# Patient Record
Sex: FEMALE | Race: WHITE | NOT HISPANIC OR LATINO
[De-identification: names, ages, dates, MRNs, and addresses within clinical notes are randomized per-mention and may not be internally consistent; named-entity substitution may affect disease eponyms.]

---

## 2017-02-24 ENCOUNTER — RX RENEWAL (OUTPATIENT)
Age: 57
End: 2017-02-24

## 2017-07-21 ENCOUNTER — NON-APPOINTMENT (OUTPATIENT)
Age: 57
End: 2017-07-21

## 2017-07-21 ENCOUNTER — APPOINTMENT (OUTPATIENT)
Dept: FAMILY MEDICINE | Facility: CLINIC | Age: 57
End: 2017-07-21

## 2017-07-21 VITALS
TEMPERATURE: 98.2 F | WEIGHT: 186 LBS | RESPIRATION RATE: 14 BRPM | OXYGEN SATURATION: 98 % | HEIGHT: 62 IN | DIASTOLIC BLOOD PRESSURE: 80 MMHG | SYSTOLIC BLOOD PRESSURE: 130 MMHG | HEART RATE: 55 BPM | BODY MASS INDEX: 34.23 KG/M2

## 2017-10-23 LAB — HBA1C MFR BLD HPLC: 5.8

## 2017-11-14 ENCOUNTER — APPOINTMENT (OUTPATIENT)
Dept: FAMILY MEDICINE | Facility: CLINIC | Age: 57
End: 2017-11-14

## 2017-11-21 ENCOUNTER — RX RENEWAL (OUTPATIENT)
Age: 57
End: 2017-11-21

## 2018-04-19 LAB — HBA1C MFR BLD HPLC: 5.7

## 2018-08-20 ENCOUNTER — RX RENEWAL (OUTPATIENT)
Age: 58
End: 2018-08-20

## 2018-09-28 LAB — HBA1C MFR BLD HPLC: 5.6

## 2018-10-01 ENCOUNTER — NON-APPOINTMENT (OUTPATIENT)
Age: 58
End: 2018-10-01

## 2018-10-01 ENCOUNTER — APPOINTMENT (OUTPATIENT)
Dept: FAMILY MEDICINE | Facility: CLINIC | Age: 58
End: 2018-10-01
Payer: COMMERCIAL

## 2018-10-01 VITALS
BODY MASS INDEX: 34.01 KG/M2 | RESPIRATION RATE: 14 BRPM | HEART RATE: 58 BPM | SYSTOLIC BLOOD PRESSURE: 116 MMHG | TEMPERATURE: 98.1 F | HEIGHT: 62 IN | DIASTOLIC BLOOD PRESSURE: 72 MMHG | OXYGEN SATURATION: 99 % | WEIGHT: 184.8 LBS

## 2018-10-01 PROCEDURE — 93000 ELECTROCARDIOGRAM COMPLETE: CPT

## 2018-10-01 PROCEDURE — 99396 PREV VISIT EST AGE 40-64: CPT | Mod: 25

## 2018-10-01 PROCEDURE — 90686 IIV4 VACC NO PRSV 0.5 ML IM: CPT

## 2018-10-01 PROCEDURE — G0008: CPT

## 2018-10-01 NOTE — HISTORY OF PRESENT ILLNESS
[de-identified] : Patient presents to the office for routine physical exam. Offers no complaints today. Patient states that she is having a hard time losing weight. Admits to not exercising a lot. Does have a strong family history of heart disease and high cholesterol.

## 2018-10-01 NOTE — ASSESSMENT
[FreeTextEntry1] : Patient presents to the office for routine physical exam. Offers no complaints today. Patient states that she is having a hard time losing weight. Admits to not exercising a lot. Does have a strong family history of heart disease and high cholesterol.\par \par Labs reviewd  Lipids mildly elevated\par EKG NSR\par Referred for ECHO\par Flu shot given\par

## 2018-10-01 NOTE — PHYSICAL EXAM

## 2018-10-01 NOTE — HEALTH RISK ASSESSMENT
[Patient reported mammogram was normal] : Patient reported mammogram was normal [Patient reported PAP Smear was normal] : Patient reported PAP Smear was normal [MammogramDate] : 4/18 [PapSmearDate] : 7/18 [ColonoscopyDate] : 5/18 [ColonoscopyComments] : small polyp removed

## 2018-10-25 ENCOUNTER — APPOINTMENT (OUTPATIENT)
Dept: CV DIAGNOSITCS | Facility: HOSPITAL | Age: 58
End: 2018-10-25

## 2018-10-25 ENCOUNTER — OUTPATIENT (OUTPATIENT)
Dept: OUTPATIENT SERVICES | Facility: HOSPITAL | Age: 58
LOS: 1 days | End: 2018-10-25
Payer: COMMERCIAL

## 2018-10-25 DIAGNOSIS — R01.1 CARDIAC MURMUR, UNSPECIFIED: ICD-10-CM

## 2018-10-25 PROCEDURE — 93306 TTE W/DOPPLER COMPLETE: CPT | Mod: 26

## 2018-10-25 PROCEDURE — 93306 TTE W/DOPPLER COMPLETE: CPT

## 2018-11-16 ENCOUNTER — APPOINTMENT (OUTPATIENT)
Dept: FAMILY MEDICINE | Facility: CLINIC | Age: 58
End: 2018-11-16
Payer: COMMERCIAL

## 2018-11-16 VITALS — DIASTOLIC BLOOD PRESSURE: 60 MMHG | TEMPERATURE: 98.3 F | SYSTOLIC BLOOD PRESSURE: 120 MMHG

## 2018-11-16 DIAGNOSIS — M25.471 EFFUSION, RIGHT ANKLE: ICD-10-CM

## 2018-11-16 PROCEDURE — 99213 OFFICE O/P EST LOW 20 MIN: CPT

## 2018-11-16 NOTE — REVIEW OF SYSTEMS
[Joint Pain] : joint pain [Joint Swelling] : joint swelling [Negative] : Constitutional [Joint Stiffness] : no joint stiffness [Muscle Weakness] : no muscle weakness [Muscle Pain] : no muscle pain [Back Pain] : no back pain

## 2018-11-16 NOTE — HISTORY OF PRESENT ILLNESS
[FreeTextEntry8] : 49-year-old female with a history of hypertension presents for right ankle pain for over a year. Patient reports that she had right ankle injury 12 years ago, no surgery but she was put in a cast. Patient states that since that time she was okay until one year ago. Patient reports that every morning when she wakes up she feels pain on the outer portion of the ankle. Feels a dull ache 2/10 severity worse with the first step and then better throughout the day. Radiates up the ankle slightly.

## 2018-11-16 NOTE — PHYSICAL EXAM
[No Acute Distress] : no acute distress [Well Nourished] : well nourished [Well Developed] : well developed [Normal Voice/Communication] : normal voice/communication [No Joint Swelling] : no joint swelling [Grossly Normal Strength/Tone] : grossly normal strength/tone

## 2019-02-05 ENCOUNTER — APPOINTMENT (OUTPATIENT)
Dept: FAMILY MEDICINE | Facility: CLINIC | Age: 59
End: 2019-02-05

## 2019-05-14 ENCOUNTER — RESULT REVIEW (OUTPATIENT)
Age: 59
End: 2019-05-14

## 2019-05-19 ENCOUNTER — RX RENEWAL (OUTPATIENT)
Age: 59
End: 2019-05-19

## 2019-08-22 ENCOUNTER — APPOINTMENT (OUTPATIENT)
Dept: FAMILY MEDICINE | Facility: CLINIC | Age: 59
End: 2019-08-22
Payer: COMMERCIAL

## 2019-08-22 VITALS
BODY MASS INDEX: 34.78 KG/M2 | SYSTOLIC BLOOD PRESSURE: 118 MMHG | HEIGHT: 62 IN | HEART RATE: 64 BPM | RESPIRATION RATE: 14 BRPM | DIASTOLIC BLOOD PRESSURE: 80 MMHG | OXYGEN SATURATION: 98 % | WEIGHT: 189 LBS | TEMPERATURE: 98.4 F

## 2019-08-22 PROCEDURE — 99214 OFFICE O/P EST MOD 30 MIN: CPT

## 2019-08-22 NOTE — PHYSICAL EXAM
[Normal] : normal gait, coordination grossly intact, no focal deficits [de-identified] : grade 2/6 systolic murmur  [de-identified] : Warm, dry, intact.  No rashes.  [de-identified] : AA&Ox3

## 2019-08-22 NOTE — PLAN
[FreeTextEntry1] : Pre-op\par - Pt is optimized for surgery, no additional testing needed.\par - d/c all vitamins until surgery, Restart 72hrs s/p surgery.\par - Pt provided pre-op clearance form, to be scanned and sent to surgeon.\par

## 2019-08-22 NOTE — RESULTS/DATA
[] : results reviewed [de-identified] : normal [de-identified] : normal [de-identified] : normal

## 2019-08-22 NOTE — HISTORY OF PRESENT ILLNESS
[No Pertinent Cardiac History] : no history of aortic stenosis, atrial fibrillation, coronary artery disease, recent myocardial infarction, or implantable device/pacemaker [No Adverse Anesthesia Reaction] : no adverse anesthesia reaction in self or family member [No Pertinent Pulmonary History] : no history of asthma, COPD, sleep apnea, or smoking [(Patient denies any chest pain, claudication, dyspnea on exertion, orthopnea, palpitations or syncope)] : Patient denies any chest pain, claudication, dyspnea on exertion, orthopnea, palpitations or syncope [Chronic Anticoagulation] : no chronic anticoagulation [Chronic Kidney Disease] : no chronic kidney disease [Diabetes] : no diabetes [FreeTextEntry1] : cone of cervix [FreeTextEntry2] : 08/27/2019  [FreeTextEntry4] : 57y/o F with PMHx of HTN, HLD and Prediabetes and PSHx of tubal ligation presents to the office for Cone biopsy pre op. Pt has h/o HPV, dx over 7 years ago. Pt had abnormal pap smears for past 2 years. Pt was started on Folic acid and had several biopsies. BP in office is 118/80. Denies HA, CP, SOB, N/V/D, fever or chills. NKDA.

## 2019-08-22 NOTE — ADDENDUM
[FreeTextEntry1] : I, Caterina Wood, acted solely as a scribe for Dr. Solorzano on this date 08/22/2019.\par \par All medical record entries made by the Scribe were at my, Dr. Solorzano, direction and personally dictated by me on 08/22/2019. I have reviewed the chart and agree that the record accurately reflects my personal performance of the history, physical exam, assessment and plan.  I have also personally directed, reviewed and agreed with the chart.

## 2019-08-22 NOTE — REVIEW OF SYSTEMS
[Negative] : Neurological [Fever] : no fever [Chills] : no chills [Chest Pain] : no chest pain [Shortness Of Breath] : no shortness of breath [Nausea] : no nausea [Diarrhea] : diarrhea [Vomiting] : no vomiting [Headache] : no headache

## 2019-12-05 ENCOUNTER — APPOINTMENT (OUTPATIENT)
Dept: FAMILY MEDICINE | Facility: CLINIC | Age: 59
End: 2019-12-05
Payer: COMMERCIAL

## 2019-12-05 VITALS
BODY MASS INDEX: 34.6 KG/M2 | DIASTOLIC BLOOD PRESSURE: 74 MMHG | RESPIRATION RATE: 14 BRPM | HEIGHT: 62 IN | OXYGEN SATURATION: 99 % | TEMPERATURE: 97.7 F | SYSTOLIC BLOOD PRESSURE: 130 MMHG | HEART RATE: 59 BPM | WEIGHT: 188 LBS

## 2019-12-05 PROCEDURE — G0008: CPT

## 2019-12-05 PROCEDURE — 90686 IIV4 VACC NO PRSV 0.5 ML IM: CPT

## 2019-12-05 PROCEDURE — 93000 ELECTROCARDIOGRAM COMPLETE: CPT

## 2019-12-05 PROCEDURE — 99396 PREV VISIT EST AGE 40-64: CPT | Mod: 25

## 2019-12-05 RX ORDER — NAPROXEN 500 MG/1
500 TABLET ORAL
Qty: 28 | Refills: 0 | Status: COMPLETED | COMMUNITY
Start: 2018-11-16 | End: 2019-12-05

## 2019-12-05 NOTE — HISTORY OF PRESENT ILLNESS
[de-identified] : 58y/o F with PMHx of HTN (Losartan/HCTZ), Prediabetes, and HLD  presents to the office for routine annual physical exam. 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 Pt had echocardiogram in 2018, normal. Pt admits not exercising. Offers no new complaints at this time. BP in office is 130/74, on repeat 130/80. Pt monitors BP occasionally with SBP ranging from 115-120. Last colonoscopy in 2018. Last Mammo in 2018. Last PAP in 2019, normal. Pt had cone biopsy 4 months ago. NKDA. Denies HA, CP, SOB, N/V/D, fever, or chills. Denies changes in bowel or bladder function. Requests flu vaccine in office.

## 2019-12-05 NOTE — PLAN
[FreeTextEntry1] : Health maintenance\par - Routine annual blood work drawn in office today\par - Encouraged exercise, weight loss, and healthy diet\par - Discussed Shingrix\par - Flu vaccine given in office today \par \par HTN\par - F/u in 6 months for BP check and weight check

## 2019-12-05 NOTE — PHYSICAL EXAM
[Normal] : normal gait, coordination grossly intact, no focal deficits [de-identified] : no cervical lymphadenopathy  [de-identified] : 1/6 systolic murmur [de-identified] : AA&Ox3  [de-identified] : Warm, dry, intact. No rashes.

## 2019-12-05 NOTE — REVIEW OF SYSTEMS
[Vision Problems] : vision problems [Negative] : Neurological [Fever] : no fever [Chest Pain] : no chest pain [Chills] : no chills [Shortness Of Breath] : no shortness of breath [Nausea] : no nausea [Vomiting] : no vomiting [Diarrhea] : diarrhea [Headache] : no headache

## 2019-12-05 NOTE — ADDENDUM
[FreeTextEntry1] : I, Margoth Pinzon, acted solely as a scribe for Dr. Solorzano on this date 12/05/2019.\par \par All medical record entries made by the Scribe were at my, Dr. Solorzano, direction and personally dictated by me on 12/05/2019. I have reviewed the chart and agree that the record accurately reflects my personal performance of the history, physical exam, assessment and plan.  I have also personally directed, reviewed and agreed with the chart.

## 2019-12-12 LAB
25(OH)D3 SERPL-MCNC: 51.6 NG/ML
ALBUMIN SERPL ELPH-MCNC: 4.1 G/DL
ALP BLD-CCNC: 103 U/L
ALT SERPL-CCNC: 19 U/L
ANION GAP SERPL CALC-SCNC: 13 MMOL/L
APPEARANCE: CLEAR
AST SERPL-CCNC: 15 U/L
BACTERIA: NEGATIVE
BASOPHILS # BLD AUTO: 0.04 K/UL
BASOPHILS NFR BLD AUTO: 0.7 %
BILIRUB SERPL-MCNC: 0.3 MG/DL
BILIRUBIN URINE: NEGATIVE
BLOOD URINE: NEGATIVE
BUN SERPL-MCNC: 19 MG/DL
CALCIUM SERPL-MCNC: 9.8 MG/DL
CHLORIDE SERPL-SCNC: 104 MMOL/L
CHOLEST SERPL-MCNC: 194 MG/DL
CHOLEST/HDLC SERPL: 2.8 RATIO
CO2 SERPL-SCNC: 24 MMOL/L
COLOR: NORMAL
CREAT SERPL-MCNC: 0.65 MG/DL
EOSINOPHIL # BLD AUTO: 0.28 K/UL
EOSINOPHIL NFR BLD AUTO: 4.6 %
ESTIMATED AVERAGE GLUCOSE: 114 MG/DL
FOLATE SERPL-MCNC: 13.7 NG/ML
GLUCOSE QUALITATIVE U: NEGATIVE
GLUCOSE SERPL-MCNC: 98 MG/DL
HBA1C MFR BLD HPLC: 5.6 %
HCT VFR BLD CALC: 40.6 %
HDLC SERPL-MCNC: 70 MG/DL
HGB BLD-MCNC: 12.5 G/DL
HYALINE CASTS: 1 /LPF
IMM GRANULOCYTES NFR BLD AUTO: 0.2 %
KETONES URINE: NEGATIVE
LDLC SERPL CALC-MCNC: 112 MG/DL
LEUKOCYTE ESTERASE URINE: NEGATIVE
LYMPHOCYTES # BLD AUTO: 1.76 K/UL
LYMPHOCYTES NFR BLD AUTO: 29.2 %
MAN DIFF?: NORMAL
MCHC RBC-ENTMCNC: 28.3 PG
MCHC RBC-ENTMCNC: 30.8 GM/DL
MCV RBC AUTO: 92.1 FL
MICROSCOPIC-UA: NORMAL
MONOCYTES # BLD AUTO: 0.45 K/UL
MONOCYTES NFR BLD AUTO: 7.5 %
NEUTROPHILS # BLD AUTO: 3.49 K/UL
NEUTROPHILS NFR BLD AUTO: 57.8 %
NITRITE URINE: NEGATIVE
PH URINE: 6.5
PLATELET # BLD AUTO: 226 K/UL
POTASSIUM SERPL-SCNC: 4.2 MMOL/L
PROT SERPL-MCNC: 6.8 G/DL
PROTEIN URINE: NEGATIVE
RBC # BLD: 4.41 M/UL
RBC # FLD: 15.9 %
RED BLOOD CELLS URINE: 1 /HPF
SODIUM SERPL-SCNC: 141 MMOL/L
SPECIFIC GRAVITY URINE: 1.01
SQUAMOUS EPITHELIAL CELLS: 1 /HPF
TRIGL SERPL-MCNC: 60 MG/DL
TSH SERPL-ACNC: 2.74 UIU/ML
UROBILINOGEN URINE: NORMAL
VIT B12 SERPL-MCNC: 867 PG/ML
WBC # FLD AUTO: 6.03 K/UL
WHITE BLOOD CELLS URINE: 1 /HPF

## 2020-02-13 ENCOUNTER — RX RENEWAL (OUTPATIENT)
Age: 60
End: 2020-02-13

## 2021-01-14 ENCOUNTER — APPOINTMENT (OUTPATIENT)
Dept: FAMILY MEDICINE | Facility: CLINIC | Age: 61
End: 2021-01-14
Payer: COMMERCIAL

## 2021-01-14 ENCOUNTER — NON-APPOINTMENT (OUTPATIENT)
Age: 61
End: 2021-01-14

## 2021-01-14 VITALS
BODY MASS INDEX: 33.86 KG/M2 | HEART RATE: 61 BPM | TEMPERATURE: 98.9 F | OXYGEN SATURATION: 99 % | HEIGHT: 62 IN | DIASTOLIC BLOOD PRESSURE: 72 MMHG | SYSTOLIC BLOOD PRESSURE: 124 MMHG | RESPIRATION RATE: 14 BRPM | WEIGHT: 184 LBS

## 2021-01-14 DIAGNOSIS — K21.9 GASTRO-ESOPHAGEAL REFLUX DISEASE W/OUT ESOPHAGITIS: ICD-10-CM

## 2021-01-14 DIAGNOSIS — Z11.59 ENCOUNTER FOR SCREENING FOR OTHER VIRAL DISEASES: ICD-10-CM

## 2021-01-14 DIAGNOSIS — D72.10 EOSINOPHILIA, UNSPECIFIED: ICD-10-CM

## 2021-01-14 DIAGNOSIS — Z82.49 FAMILY HISTORY OF ISCHEMIC HEART DISEASE AND OTHER DISEASES OF THE CIRCULATORY SYSTEM: ICD-10-CM

## 2021-01-14 DIAGNOSIS — Z80.42 FAMILY HISTORY OF MALIGNANT NEOPLASM OF PROSTATE: ICD-10-CM

## 2021-01-14 DIAGNOSIS — M54.32 SCIATICA, LEFT SIDE: ICD-10-CM

## 2021-01-14 DIAGNOSIS — Z82.5 FAMILY HISTORY OF ASTHMA AND OTHER CHRONIC LOWER RESPIRATORY DISEASES: ICD-10-CM

## 2021-01-14 DIAGNOSIS — H53.8 OTHER VISUAL DISTURBANCES: ICD-10-CM

## 2021-01-14 DIAGNOSIS — Z82.3 FAMILY HISTORY OF STROKE: ICD-10-CM

## 2021-01-14 PROCEDURE — G0447 BEHAVIOR COUNSEL OBESITY 15M: CPT

## 2021-01-14 PROCEDURE — 99072 ADDL SUPL MATRL&STAF TM PHE: CPT

## 2021-01-14 PROCEDURE — G0442 ANNUAL ALCOHOL SCREEN 15 MIN: CPT

## 2021-01-14 PROCEDURE — 93000 ELECTROCARDIOGRAM COMPLETE: CPT | Mod: 59

## 2021-01-14 PROCEDURE — G0444 DEPRESSION SCREEN ANNUAL: CPT

## 2021-01-14 PROCEDURE — 99396 PREV VISIT EST AGE 40-64: CPT | Mod: 25

## 2021-01-15 ENCOUNTER — TRANSCRIPTION ENCOUNTER (OUTPATIENT)
Age: 61
End: 2021-01-15

## 2021-01-15 PROBLEM — M54.32 SCIATICA OF LEFT SIDE: Status: ACTIVE | Noted: 2021-01-15

## 2021-01-15 PROBLEM — D72.10 MARKED EOSINOPHILIA: Status: ACTIVE | Noted: 2021-01-14

## 2021-01-15 PROBLEM — K21.9 GERD (GASTROESOPHAGEAL REFLUX DISEASE): Status: ACTIVE | Noted: 2021-01-15

## 2021-01-15 NOTE — PHYSICAL EXAM
[Declined Breast Exam] : declined breast exam  [Coordination Grossly Intact] : coordination grossly intact [No Focal Deficits] : no focal deficits [Normal Gait] : normal gait [Speech Grossly Normal] : speech grossly normal [Alert and Oriented x3] : oriented to person, place, and time [Normal Mood] : the mood was normal [Normal] : affect was normal and insight and judgment were intact

## 2021-01-15 NOTE — COUNSELING
[Potential consequences of obesity discussed] : Potential consequences of obesity discussed [Benefits of weight loss discussed] : Benefits of weight loss discussed [Encouraged to increase physical activity] : Encouraged to increase physical activity [Encouraged to use exercise tracking device] : Encouraged to use exercise tracking device [Weigh Self Weekly] : weigh self weekly [Decrease Portions] : decrease portions [____ min/wk Activity] : [unfilled] min/wk activity [Good understanding] : Patient has a good understanding of disease, goals and obesity follow-up plan [FreeTextEntry4] : 15

## 2021-01-15 NOTE — HEALTH RISK ASSESSMENT
[Fair] :  ~his/her~ mood as fair [Yes] : Yes [2 - 4 times a month (2 pts)] : 2-4 times a month (2 points) [1 or 2 (0 pts)] : 1 or 2 (0 points) [Never (0 pts)] : Never (0 points) [No] : In the past 12 months have you used drugs other than those required for medical reasons? No [No falls in past year] : Patient reported no falls in the past year [0] : 2) Feeling down, depressed, or hopeless: Not at all (0) [Patient reported mammogram was normal] : Patient reported mammogram was normal [Patient reported PAP Smear was abnormal] : Patient reported PAP Smear was abnormal [Patient reported colonoscopy was normal] : Patient reported colonoscopy was normal [With Family] : lives with family [# of Members in Household ___] :  household currently consist of [unfilled] member(s) [Employed] : employed [] :  [# Of Children ___] : has [unfilled] children [Sexually Active] : sexually active [Feels Safe at Home] : Feels safe at home [Fully functional (bathing, dressing, toileting, transferring, walking, feeding)] : Fully functional (bathing, dressing, toileting, transferring, walking, feeding) [Fully functional (using the telephone, shopping, preparing meals, housekeeping, doing laundry, using] : Fully functional and needs no help or supervision to perform IADLs (using the telephone, shopping, preparing meals, housekeeping, doing laundry, using transportation, managing medications and managing finances) [Reports changes in vision] : Reports changes in vision [Reports normal functional visual acuity (ie: able to read med bottle)] : Reports normal functional visual acuity [FreeTextEntry1] : see hpi [] : No [Audit-CScore] : 2 [de-identified] : not great [de-identified] : plans to increase [[id number]] : 0 [Reports changes in hearing] : Reports no changes in hearing [MammogramDate] : 2020 [PapSmearComments] : HPV [PapSmearDate] : 2020 [BoneDensityComments] : scheduled already [ColonoscopyDate] : 2018 [ColonoscopyComments] : polyp, f/u in 5 years [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

## 2021-01-15 NOTE — REVIEW OF SYSTEMS
[Heartburn] : heartburn [Muscle Pain] : muscle pain [Back Pain] : back pain [Negative] : Heme/Lymph [Vision Problems] : vision problems [Discharge] : no discharge [Pain] : no pain [Redness] : no redness [Dryness] : no dryness  [Itching] : no itching [Abdominal Pain] : no abdominal pain [Nausea] : no nausea [Constipation] : no constipation [Diarrhea] : diarrhea [Vomiting] : no vomiting [Melena] : no melena [FreeTextEntry3] : right eye [FreeTextEntry9] : lower back pain, radiating into legs

## 2021-01-15 NOTE — HISTORY OF PRESENT ILLNESS
[FreeTextEntry1] : 59 yo female with PMHx HTN (losartan/hctz) presents to the office for a physical. [de-identified] : The patient reports experiencing episodes of lower back pain, when she is sitting or standing for too long, which radiates into her lower extremities, left greater than right leg. she denies any weakness, but reports some "shooting pain and tingling" down her thighs.

## 2021-04-19 ENCOUNTER — NON-APPOINTMENT (OUTPATIENT)
Age: 61
End: 2021-04-19

## 2021-04-28 ENCOUNTER — RESULT REVIEW (OUTPATIENT)
Age: 61
End: 2021-04-28

## 2021-04-28 ENCOUNTER — APPOINTMENT (OUTPATIENT)
Dept: MAMMOGRAPHY | Facility: CLINIC | Age: 61
End: 2021-04-28
Payer: COMMERCIAL

## 2021-04-28 ENCOUNTER — APPOINTMENT (OUTPATIENT)
Dept: ULTRASOUND IMAGING | Facility: CLINIC | Age: 61
End: 2021-04-28
Payer: COMMERCIAL

## 2021-04-28 PROCEDURE — 76641 ULTRASOUND BREAST COMPLETE: CPT | Mod: 50

## 2021-04-28 PROCEDURE — 77067 SCR MAMMO BI INCL CAD: CPT

## 2021-04-28 PROCEDURE — 77063 BREAST TOMOSYNTHESIS BI: CPT

## 2022-01-18 ENCOUNTER — APPOINTMENT (OUTPATIENT)
Dept: FAMILY MEDICINE | Facility: CLINIC | Age: 62
End: 2022-01-18
Payer: COMMERCIAL

## 2022-01-18 ENCOUNTER — NON-APPOINTMENT (OUTPATIENT)
Age: 62
End: 2022-01-18

## 2022-01-18 VITALS
TEMPERATURE: 97.1 F | OXYGEN SATURATION: 98 % | HEART RATE: 64 BPM | DIASTOLIC BLOOD PRESSURE: 72 MMHG | SYSTOLIC BLOOD PRESSURE: 110 MMHG | WEIGHT: 184.2 LBS | HEIGHT: 62 IN | RESPIRATION RATE: 14 BRPM | BODY MASS INDEX: 33.9 KG/M2

## 2022-01-18 DIAGNOSIS — Z23 ENCOUNTER FOR IMMUNIZATION: ICD-10-CM

## 2022-01-18 DIAGNOSIS — R01.1 CARDIAC MURMUR, UNSPECIFIED: ICD-10-CM

## 2022-01-18 PROCEDURE — 90686 IIV4 VACC NO PRSV 0.5 ML IM: CPT

## 2022-01-18 PROCEDURE — G0008: CPT

## 2022-01-18 PROCEDURE — G0447 BEHAVIOR COUNSEL OBESITY 15M: CPT

## 2022-01-18 PROCEDURE — 99396 PREV VISIT EST AGE 40-64: CPT | Mod: 25

## 2022-01-18 NOTE — HISTORY OF PRESENT ILLNESS
[de-identified] : 61/o F with PMHx of HTN (Losartan/HCTZ), Prediabetes, and HLD  presents to the office for routine annual physical exam. UTD with immunizations.Labs reviwed WNL except HBA1C 5.8 (5.8) LDL elevated. FH of heart disease. Not compliant with diet or exercise

## 2022-01-18 NOTE — PHYSICAL EXAM
[No Acute Distress] : no acute distress [Well Nourished] : well nourished [Well Developed] : well developed [Well-Appearing] : well-appearing [Normal Sclera/Conjunctiva] : normal sclera/conjunctiva [PERRL] : pupils equal round and reactive to light [EOMI] : extraocular movements intact [Normal Outer Ear/Nose] : the outer ears and nose were normal in appearance [Normal Oropharynx] : the oropharynx was normal [No JVD] : no jugular venous distention [No Lymphadenopathy] : no lymphadenopathy [Supple] : supple [Thyroid Normal, No Nodules] : the thyroid was normal and there were no nodules present [No Respiratory Distress] : no respiratory distress  [No Accessory Muscle Use] : no accessory muscle use [Clear to Auscultation] : lungs were clear to auscultation bilaterally [Normal Rate] : normal rate  [Regular Rhythm] : with a regular rhythm [Normal S1, S2] : normal S1 and S2 [No Carotid Bruits] : no carotid bruits [No Abdominal Bruit] : a ~M bruit was not heard ~T in the abdomen [No Varicosities] : no varicosities [Pedal Pulses Present] : the pedal pulses are present [No Edema] : there was no peripheral edema [No Palpable Aorta] : no palpable aorta [No Extremity Clubbing/Cyanosis] : no extremity clubbing/cyanosis [Soft] : abdomen soft [Non Tender] : non-tender [Non-distended] : non-distended [No Masses] : no abdominal mass palpated [No HSM] : no HSM [Normal Bowel Sounds] : normal bowel sounds [Normal Posterior Cervical Nodes] : no posterior cervical lymphadenopathy [Normal Anterior Cervical Nodes] : no anterior cervical lymphadenopathy [No CVA Tenderness] : no CVA  tenderness [No Spinal Tenderness] : no spinal tenderness [No Joint Swelling] : no joint swelling [Grossly Normal Strength/Tone] : grossly normal strength/tone [No Rash] : no rash [Coordination Grossly Intact] : coordination grossly intact [No Focal Deficits] : no focal deficits [Normal Gait] : normal gait [Deep Tendon Reflexes (DTR)] : deep tendon reflexes were 2+ and symmetric [Normal Affect] : the affect was normal [Normal Insight/Judgement] : insight and judgment were intact [de-identified] : Gr 1-2 /6 systolic M

## 2022-03-14 ENCOUNTER — RX RENEWAL (OUTPATIENT)
Age: 62
End: 2022-03-14

## 2022-04-11 PROBLEM — Z11.59 SCREENING FOR VIRAL DISEASE: Status: ACTIVE | Noted: 2021-01-14

## 2022-06-13 ENCOUNTER — APPOINTMENT (OUTPATIENT)
Dept: FAMILY MEDICINE | Facility: CLINIC | Age: 62
End: 2022-06-13
Payer: COMMERCIAL

## 2022-06-13 VITALS
OXYGEN SATURATION: 97 % | SYSTOLIC BLOOD PRESSURE: 120 MMHG | DIASTOLIC BLOOD PRESSURE: 68 MMHG | HEART RATE: 77 BPM | RESPIRATION RATE: 14 BRPM | TEMPERATURE: 98 F

## 2022-06-13 DIAGNOSIS — R21 RASH AND OTHER NONSPECIFIC SKIN ERUPTION: ICD-10-CM

## 2022-06-13 PROCEDURE — 99213 OFFICE O/P EST LOW 20 MIN: CPT

## 2022-06-16 ENCOUNTER — APPOINTMENT (OUTPATIENT)
Dept: MAMMOGRAPHY | Facility: CLINIC | Age: 62
End: 2022-06-16

## 2022-06-16 ENCOUNTER — APPOINTMENT (OUTPATIENT)
Dept: ULTRASOUND IMAGING | Facility: CLINIC | Age: 62
End: 2022-06-16

## 2022-06-20 PROBLEM — R21 RASH: Status: ACTIVE | Noted: 2022-06-13

## 2022-06-20 NOTE — HISTORY OF PRESENT ILLNESS
[FreeTextEntry8] : c/o rash \par rash recurrent on an off x 1 year\par about a year ago had on belly- was warm and irritated and itchy \par lasted about 3 days\par does not recall any trigger, injury or contact \par occurs every 3 months and then goes away \par 5 days ago- recurrent wheal on sole of foot which went away on its own 2 days ago \par the one on abdomen 2 days ago \par denies any other associated symptoms\par denies any contacts or complaints at this time

## 2022-08-19 DIAGNOSIS — Z12.39 ENCOUNTER FOR OTHER SCREENING FOR MALIGNANT NEOPLASM OF BREAST: ICD-10-CM

## 2022-08-23 ENCOUNTER — RESULT REVIEW (OUTPATIENT)
Age: 62
End: 2022-08-23

## 2022-08-23 ENCOUNTER — OUTPATIENT (OUTPATIENT)
Dept: OUTPATIENT SERVICES | Facility: HOSPITAL | Age: 62
LOS: 1 days | End: 2022-08-23
Payer: MEDICAID

## 2022-08-23 ENCOUNTER — APPOINTMENT (OUTPATIENT)
Dept: MAMMOGRAPHY | Facility: IMAGING CENTER | Age: 62
End: 2022-08-23

## 2022-08-23 ENCOUNTER — APPOINTMENT (OUTPATIENT)
Dept: ULTRASOUND IMAGING | Facility: IMAGING CENTER | Age: 62
End: 2022-08-23

## 2022-08-23 DIAGNOSIS — Z12.39 ENCOUNTER FOR OTHER SCREENING FOR MALIGNANT NEOPLASM OF BREAST: ICD-10-CM

## 2022-08-23 DIAGNOSIS — R92.2 INCONCLUSIVE MAMMOGRAM: ICD-10-CM

## 2022-08-23 PROCEDURE — 76641 ULTRASOUND BREAST COMPLETE: CPT

## 2022-08-23 PROCEDURE — 77063 BREAST TOMOSYNTHESIS BI: CPT

## 2022-08-23 PROCEDURE — 77067 SCR MAMMO BI INCL CAD: CPT

## 2022-08-23 PROCEDURE — 77063 BREAST TOMOSYNTHESIS BI: CPT | Mod: 26

## 2022-08-23 PROCEDURE — 77067 SCR MAMMO BI INCL CAD: CPT | Mod: 26

## 2022-08-23 PROCEDURE — 76641 ULTRASOUND BREAST COMPLETE: CPT | Mod: 26,50

## 2022-10-06 ENCOUNTER — APPOINTMENT (OUTPATIENT)
Dept: FAMILY MEDICINE | Facility: CLINIC | Age: 62
End: 2022-10-06

## 2022-10-06 ENCOUNTER — NON-APPOINTMENT (OUTPATIENT)
Age: 62
End: 2022-10-06

## 2022-10-06 VITALS
BODY MASS INDEX: 34.23 KG/M2 | DIASTOLIC BLOOD PRESSURE: 72 MMHG | HEART RATE: 53 BPM | SYSTOLIC BLOOD PRESSURE: 116 MMHG | WEIGHT: 186 LBS | OXYGEN SATURATION: 97 % | TEMPERATURE: 97.6 F | HEIGHT: 62 IN | RESPIRATION RATE: 14 BRPM

## 2022-10-06 DIAGNOSIS — Z01.818 ENCOUNTER FOR OTHER PREPROCEDURAL EXAMINATION: ICD-10-CM

## 2022-10-06 DIAGNOSIS — Z83.3 FAMILY HISTORY OF DIABETES MELLITUS: ICD-10-CM

## 2022-10-06 DIAGNOSIS — Z82.0 FAMILY HISTORY OF EPILEPSY AND OTHER DISEASES OF THE NERVOUS SYSTEM: ICD-10-CM

## 2022-10-06 DIAGNOSIS — B02.30 ZOSTER OCULAR DISEASE, UNSPECIFIED: ICD-10-CM

## 2022-10-06 DIAGNOSIS — R10.2 PELVIC AND PERINEAL PAIN: ICD-10-CM

## 2022-10-06 PROCEDURE — 99214 OFFICE O/P EST MOD 30 MIN: CPT | Mod: 25

## 2022-10-06 PROCEDURE — 93000 ELECTROCARDIOGRAM COMPLETE: CPT

## 2022-10-06 RX ORDER — MOMETASONE FUROATE 1 MG/G
0.1 OINTMENT TOPICAL TWICE DAILY
Qty: 1 | Refills: 0 | Status: COMPLETED | COMMUNITY
Start: 2022-06-13 | End: 2022-10-06

## 2022-10-06 RX ORDER — VALACYCLOVIR 500 MG/1
500 TABLET, FILM COATED ORAL
Qty: 1 | Refills: 0 | Status: ACTIVE | COMMUNITY
Start: 2022-10-06

## 2022-10-13 DIAGNOSIS — N39.0 URINARY TRACT INFECTION, SITE NOT SPECIFIED: ICD-10-CM

## 2022-10-13 DIAGNOSIS — B95.1 URINARY TRACT INFECTION, SITE NOT SPECIFIED: ICD-10-CM

## 2022-10-13 PROBLEM — Z01.818 PREOPERATIVE CLEARANCE: Status: ACTIVE | Noted: 2019-08-15

## 2022-10-13 PROBLEM — B02.30 OCULAR HERPES: Status: RESOLVED | Noted: 2022-10-06 | Resolved: 2022-10-13

## 2022-10-13 PROBLEM — R10.2 PELVIC PAIN IN FEMALE: Status: ACTIVE | Noted: 2022-10-06

## 2022-10-13 LAB — BACTERIA UR CULT: ABNORMAL

## 2022-10-13 NOTE — HISTORY OF PRESENT ILLNESS
[No Pertinent Cardiac History] : no history of aortic stenosis, atrial fibrillation, coronary artery disease, recent myocardial infarction, or implantable device/pacemaker [No Pertinent Pulmonary History] : no history of asthma, COPD, sleep apnea, or smoking [No Adverse Anesthesia Reaction] : no adverse anesthesia reaction in self or family member [(Patient denies any chest pain, claudication, dyspnea on exertion, orthopnea, palpitations or syncope)] : Patient denies any chest pain, claudication, dyspnea on exertion, orthopnea, palpitations or syncope [____ METs%] : [unfilled] METs% [Anti-Platelet Agents: _____] : Anti-Platelet Agents: [unfilled] [NSAIDs: _____] : NSAIDs: [unfilled] [Herbal Supplements: _____] : Herbal Supplements: [unfilled] [Excellent (>10 METs)] : Excellent (>10 METs) [Aortic Stenosis] : no aortic stenosis [Atrial Fibrillation] : no atrial fibrillation [Coronary Artery Disease] : no coronary artery disease [Recent Myocardial Infarction] : no recent myocardial infarction [Implantable Device/Pacemaker] : no implantable device/pacemaker [Asthma] : no asthma [COPD] : no COPD [Sleep Apnea] : no sleep apnea [Smoker] : not a smoker [Family Member] : no family member with adverse anesthesia reaction/sudden death [Self] : no previous adverse anesthesia reaction [Chronic Anticoagulation] : no chronic anticoagulation [Chronic Kidney Disease] : no chronic kidney disease [Diabetes] : no diabetes [FreeTextEntry1] : hysterectomy  [FreeTextEntry2] : 11/1/2022 [FreeTextEntry3] : Dr. Dominique Salazar  [FreeTextEntry4] : Will be undergoing hysterectomy due to ovarian cyst and endometrial poylp \par overall is feeling well\par denies any other complaints or concerns at this time  [FreeTextEntry6] : none

## 2022-12-07 ENCOUNTER — OFFICE (OUTPATIENT)
Dept: URBAN - METROPOLITAN AREA CLINIC 27 | Facility: CLINIC | Age: 62
Setting detail: OPHTHALMOLOGY
End: 2022-12-07
Payer: COMMERCIAL

## 2022-12-07 DIAGNOSIS — H18.231: ICD-10-CM

## 2022-12-07 DIAGNOSIS — B00.52: ICD-10-CM

## 2022-12-07 PROCEDURE — 92012 INTRM OPH EXAM EST PATIENT: CPT | Performed by: OPHTHALMOLOGY

## 2022-12-07 ASSESSMENT — SPHEQUIV_DERIVED
OD_SPHEQUIV: -1
OD_SPHEQUIV: -1.375
OS_SPHEQUIV: -3.875
OS_SPHEQUIV: -3.25

## 2022-12-07 ASSESSMENT — REFRACTION_MANIFEST
OD_CYLINDER: +1.75
OD_SPHERE: -1.00
OS_VA1: 20/20
OD_ADD: +2.50
OD_VA1: 20/30
OD_AXIS: 090
OS_SPHERE: -3.75
OS_AXIS: 135
OD_SPHERE: -2.25
OD_CYLINDER: SPH
OS_CYLINDER: +1.00
OD_VA1: 20/40
OD_CYLINDER: SPH
OS_ADD: +2.50
OD_VA1: 20/50-
OD_SPHERE: -1.00

## 2022-12-07 ASSESSMENT — REFRACTION_CURRENTRX
OS_OVR_VA: 20/
OD_AXIS: 58
OD_OVR_VA: 20/
OD_VPRISM_DIRECTION: PROGS
OD_SPHERE: -0.75
OD_ADD: +2.25
OS_AXIS: 34
OS_OVR_VA: 20/
OD_CYLINDER: -0.25
OS_AXIS: 114
OS_SPHERE: -3.25
OS_ADD: +2.25
OS_CYLINDER: +0.75
OS_CYLINDER: -0.75
OD_VPRISM_DIRECTION: PROGS
OS_ADD: +2.50
OD_ADD: +2.50
OS_CYLINDER: -0.50
OD_OVR_VA: 20/
OS_AXIS: 026
OD_CYLINDER: SPH
OD_SPHERE: -0.50
OS_OVR_VA: 20/
OD_OVR_VA: 20/
OS_VPRISM_DIRECTION: PROGS
OD_CYLINDER: SPH
OD_VPRISM_DIRECTION: PROGS
OD_SPHERE: -0.75
OS_VPRISM_DIRECTION: PROGS
OS_SPHERE: -3.75
OS_VPRISM_DIRECTION: PROGS
OS_SPHERE: -4.00
OS_ADD: +2.25
OD_ADD: +2.25

## 2022-12-07 ASSESSMENT — CONFRONTATIONAL VISUAL FIELD TEST (CVF)
OD_FINDINGS: FULL
OS_FINDINGS: FULL

## 2022-12-07 ASSESSMENT — SUPERFICIAL PUNCTATE KERATITIS (SPK): OD_SPK: T

## 2022-12-07 ASSESSMENT — PACHYMETRY
OS_CT_CORRECTION: -3
OS_CT_UM: 582

## 2022-12-07 ASSESSMENT — AXIALLENGTH_DERIVED
OD_AL: 23.5767
OS_AL: 24.4425
OS_AL: 24.1839
OD_AL: 23.4316

## 2022-12-07 ASSESSMENT — REFRACTION_AUTOREFRACTION
OS_SPHERE: -5.00
OS_CYLINDER: +2.25
OD_AXIS: 107
OS_AXIS: 127
OD_SPHERE: -2.50
OD_CYLINDER: +3.00

## 2022-12-07 ASSESSMENT — KERATOMETRY
OS_K2POWER_DIOPTERS: 45.75
OD_K1POWER_DIOPTERS: 44.00
OS_AXISANGLE_DEGREES: 115
OS_K1POWER_DIOPTERS: 45.00
OD_AXISANGLE_DEGREES: 096
METHOD_AUTO_MANUAL: AUTO
OD_K2POWER_DIOPTERS: 46.00

## 2022-12-07 ASSESSMENT — TONOMETRY
OD_IOP_MMHG: 12
OS_IOP_MMHG: 15
OS_IOP_MMHG: 12
OD_IOP_MMHG: 12

## 2022-12-07 ASSESSMENT — VISUAL ACUITY
OS_BCVA: 20/50-2
OD_BCVA: 20/25-1+1

## 2023-03-16 LAB
ALBUMIN SERPL ELPH-MCNC: 4.2 G/DL
ALP BLD-CCNC: 108 U/L
ALT SERPL-CCNC: 23 U/L
ANION GAP SERPL CALC-SCNC: 11 MMOL/L
APPEARANCE: CLEAR
APTT BLD: 29.3 SEC
AST SERPL-CCNC: 19 U/L
BACTERIA: NEGATIVE
BASOPHILS # BLD AUTO: 0.04 K/UL
BASOPHILS NFR BLD AUTO: 0.6 %
BILIRUB DIRECT SERPL-MCNC: 0.1 MG/DL
BILIRUB INDIRECT SERPL-MCNC: 0.2 MG/DL
BILIRUB SERPL-MCNC: 0.3 MG/DL
BILIRUBIN URINE: NEGATIVE
BLOOD URINE: NEGATIVE
BUN SERPL-MCNC: 25 MG/DL
CALCIUM SERPL-MCNC: 10.4 MG/DL
CHLORIDE SERPL-SCNC: 102 MMOL/L
CO2 SERPL-SCNC: 28 MMOL/L
COLOR: NORMAL
CREAT SERPL-MCNC: 0.78 MG/DL
EGFR: 86 ML/MIN/1.73M2
EOSINOPHIL # BLD AUTO: 0.34 K/UL
EOSINOPHIL NFR BLD AUTO: 4.8 %
GLUCOSE QUALITATIVE U: NEGATIVE
GLUCOSE SERPL-MCNC: 89 MG/DL
HCT VFR BLD CALC: 42.3 %
HGB BLD-MCNC: 12.9 G/DL
HYALINE CASTS: 0 /LPF
IMM GRANULOCYTES NFR BLD AUTO: 0.1 %
INR PPP: 0.98 RATIO
KETONES URINE: NEGATIVE
LEUKOCYTE ESTERASE URINE: ABNORMAL
LYMPHOCYTES # BLD AUTO: 1.97 K/UL
LYMPHOCYTES NFR BLD AUTO: 28.1 %
MAN DIFF?: NORMAL
MCHC RBC-ENTMCNC: 28 PG
MCHC RBC-ENTMCNC: 30.5 GM/DL
MCV RBC AUTO: 92 FL
MICROSCOPIC-UA: NORMAL
MONOCYTES # BLD AUTO: 0.53 K/UL
MONOCYTES NFR BLD AUTO: 7.5 %
NEUTROPHILS # BLD AUTO: 4.13 K/UL
NEUTROPHILS NFR BLD AUTO: 58.9 %
NITRITE URINE: NEGATIVE
PH URINE: 7.5
PLATELET # BLD AUTO: 231 K/UL
POTASSIUM SERPL-SCNC: 4.1 MMOL/L
PROT SERPL-MCNC: 7.3 G/DL
PROTEIN URINE: NEGATIVE
PT BLD: 11.4 SEC
RBC # BLD: 4.6 M/UL
RBC # FLD: 14.1 %
RED BLOOD CELLS URINE: 2 /HPF
SODIUM SERPL-SCNC: 141 MMOL/L
SPECIFIC GRAVITY URINE: 1.01
SQUAMOUS EPITHELIAL CELLS: 2 /HPF
UROBILINOGEN URINE: NORMAL
WBC # FLD AUTO: 7.02 K/UL
WHITE BLOOD CELLS URINE: 2 /HPF

## 2023-03-22 ENCOUNTER — APPOINTMENT (OUTPATIENT)
Dept: FAMILY MEDICINE | Facility: CLINIC | Age: 63
End: 2023-03-22
Payer: MEDICAID

## 2023-03-22 VITALS
HEIGHT: 62 IN | DIASTOLIC BLOOD PRESSURE: 82 MMHG | HEART RATE: 54 BPM | WEIGHT: 174.6 LBS | RESPIRATION RATE: 14 BRPM | SYSTOLIC BLOOD PRESSURE: 120 MMHG | OXYGEN SATURATION: 98 % | BODY MASS INDEX: 32.13 KG/M2 | TEMPERATURE: 95.5 F

## 2023-03-22 DIAGNOSIS — E55.9 VITAMIN D DEFICIENCY, UNSPECIFIED: ICD-10-CM

## 2023-03-22 PROCEDURE — 99396 PREV VISIT EST AGE 40-64: CPT | Mod: 25

## 2023-03-22 PROCEDURE — G0447 BEHAVIOR COUNSEL OBESITY 15M: CPT

## 2023-03-22 PROCEDURE — G0444 DEPRESSION SCREEN ANNUAL: CPT | Mod: 59

## 2023-03-22 RX ORDER — CEPHALEXIN 500 MG/1
500 CAPSULE ORAL 3 TIMES DAILY
Qty: 15 | Refills: 0 | Status: COMPLETED | COMMUNITY
Start: 2022-10-13 | End: 2023-03-22

## 2023-03-27 PROBLEM — E55.9 VITAMIN D DEFICIENCY: Status: ACTIVE | Noted: 2023-03-14

## 2023-03-27 NOTE — HEALTH RISK ASSESSMENT
[Very Good] : ~his/her~  mood as very good [Yes] : Yes [2 - 4 times a month (2 pts)] : 2-4 times a month (2 points) [1 or 2 (0 pts)] : 1 or 2 (0 points) [Never (0 pts)] : Never (0 points) [No] : In the past 12 months have you used drugs other than those required for medical reasons? No [0] : 2) Feeling down, depressed, or hopeless: Not at all (0) [PHQ-2 Negative - No further assessment needed] : PHQ-2 Negative - No further assessment needed [Patient reported colonoscopy was abnormal] : Patient reported colonoscopy was abnormal [With Family] : lives with family [# of Members in Household ___] :  household currently consist of [unfilled] member(s) [Employed] : employed [] :  [# Of Children ___] : has [unfilled] children [Sexually Active] : sexually active [Feels Safe at Home] : Feels safe at home [Fully functional (bathing, dressing, toileting, transferring, walking, feeding)] : Fully functional (bathing, dressing, toileting, transferring, walking, feeding) [Fully functional (using the telephone, shopping, preparing meals, housekeeping, doing laundry, using] : Fully functional and needs no help or supervision to perform IADLs (using the telephone, shopping, preparing meals, housekeeping, doing laundry, using transportation, managing medications and managing finances) [Reports normal functional visual acuity (ie: able to read med bottle)] : Reports normal functional visual acuity [No falls in past year] : Patient reported no falls in the past year [Patient reported mammogram was normal] : Patient reported mammogram was normal [Patient reported PAP Smear was normal] : Patient reported PAP Smear was normal [Patient reported bone density results were abnormal] : Patient reported bone density results were abnormal [Audit-CScore] : 2 [de-identified] : active [de-identified] : tries to be healthy [[id number]] : 0 [High Risk Behavior] : no high risk behavior [Reports changes in hearing] : Reports no changes in hearing [Reports changes in vision] : Reports no changes in vision [MammogramDate] : 2022 [PapSmearDate] : 2022 [BoneDensityDate] : 2021 [BoneDensityComments] : referral given [ColonoscopyDate] : 2018 [ColonoscopyComments] : polyp, f/u in five years [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

## 2023-03-27 NOTE — HISTORY OF PRESENT ILLNESS
[FreeTextEntry1] : 63 yo female with PMHx HTN (losartan/hctz) presents to the office for a physical.  [de-identified] : the patient reports feeling well, has been experiencing increase in hot flashes, usually anxiety related. is UTD with shingles vaccine.

## 2023-06-21 ENCOUNTER — RX ONLY (RX ONLY)
Age: 63
End: 2023-06-21

## 2023-06-21 ENCOUNTER — OFFICE (OUTPATIENT)
Dept: URBAN - METROPOLITAN AREA CLINIC 27 | Facility: CLINIC | Age: 63
Setting detail: OPHTHALMOLOGY
End: 2023-06-21
Payer: COMMERCIAL

## 2023-06-21 DIAGNOSIS — H25.13: ICD-10-CM

## 2023-06-21 DIAGNOSIS — H18.231: ICD-10-CM

## 2023-06-21 DIAGNOSIS — B00.52: ICD-10-CM

## 2023-06-21 PROCEDURE — 92012 INTRM OPH EXAM EST PATIENT: CPT | Performed by: OPHTHALMOLOGY

## 2023-06-21 ASSESSMENT — REFRACTION_CURRENTRX
OS_CYLINDER: +0.75
OD_CYLINDER: SPH
OS_SPHERE: -3.25
OS_OVR_VA: 20/
OD_OVR_VA: 20/
OS_SPHERE: -3.75
OD_SPHERE: -0.50
OD_VPRISM_DIRECTION: PROGS
OD_VPRISM_DIRECTION: PROGS
OS_OVR_VA: 20/
OS_ADD: +2.25
OS_OVR_VA: 20/
OD_ADD: +2.50
OS_AXIS: 114
OD_CYLINDER: -0.25
OS_SPHERE: -4.00
OS_ADD: +2.25
OS_VPRISM_DIRECTION: PROGS
OD_OVR_VA: 20/
OD_OVR_VA: 20/
OS_CYLINDER: -0.50
OS_ADD: +2.50
OS_VPRISM_DIRECTION: PROGS
OD_SPHERE: -0.75
OD_CYLINDER: SPH
OS_VPRISM_DIRECTION: PROGS
OD_ADD: +2.25
OD_VPRISM_DIRECTION: PROGS
OD_SPHERE: -0.75
OD_ADD: +2.25
OS_CYLINDER: -0.75
OS_AXIS: 34
OS_AXIS: 026
OD_AXIS: 58

## 2023-06-21 ASSESSMENT — REFRACTION_MANIFEST
OD_VA1: 20/50-
OD_CYLINDER: SPH
OD_VA1: 20/40
OD_SPHERE: -1.00
OS_AXIS: 135
OD_SPHERE: -2.25
OD_AXIS: 090
OS_SPHERE: -3.75
OD_CYLINDER: +1.75
OD_SPHERE: -1.00
OS_ADD: +2.50
OS_VA1: 20/20
OD_ADD: +2.50
OS_CYLINDER: +1.00
OD_VA1: 20/30
OD_CYLINDER: SPH

## 2023-06-21 ASSESSMENT — REFRACTION_AUTOREFRACTION
OS_SPHERE: -4.75
OS_CYLINDER: +2.22
OD_CYLINDER: +2.75
OD_AXIS: 106
OD_SPHERE: -2.00
OS_AXIS: 5128

## 2023-06-21 ASSESSMENT — VISUAL ACUITY
OS_BCVA: 20/40-2
OD_BCVA: 20/25-2

## 2023-06-21 ASSESSMENT — SPHEQUIV_DERIVED
OD_SPHEQUIV: -1.375
OS_SPHEQUIV: -3.25
OS_SPHEQUIV: -3.64
OD_SPHEQUIV: -0.625

## 2023-06-21 ASSESSMENT — KERATOMETRY
OD_K1POWER_DIOPTERS: 44.00
METHOD_AUTO_MANUAL: AUTO
OS_K2POWER_DIOPTERS: 45.75
OD_AXISANGLE_DEGREES: 087
OD_K2POWER_DIOPTERS: 46.00
OS_K1POWER_DIOPTERS: 45.00
OS_AXISANGLE_DEGREES: 111

## 2023-06-21 ASSESSMENT — PACHYMETRY
OS_CT_UM: 582
OS_CT_CORRECTION: -3

## 2023-06-21 ASSESSMENT — AXIALLENGTH_DERIVED
OS_AL: 24.34
OD_AL: 23.2884
OD_AL: 23.5767
OS_AL: 24.1839

## 2023-06-21 ASSESSMENT — TONOMETRY
OS_IOP_MMHG: 15
OD_IOP_MMHG: 15
OS_IOP_MMHG: 14

## 2023-06-21 ASSESSMENT — SUPERFICIAL PUNCTATE KERATITIS (SPK): OD_SPK: T

## 2023-09-18 ENCOUNTER — OFFICE (OUTPATIENT)
Dept: URBAN - METROPOLITAN AREA CLINIC 27 | Facility: CLINIC | Age: 63
Setting detail: OPHTHALMOLOGY
End: 2023-09-18

## 2023-09-18 DIAGNOSIS — H25.13: ICD-10-CM

## 2023-09-18 DIAGNOSIS — H10.45: ICD-10-CM

## 2023-09-18 DIAGNOSIS — B00.52: ICD-10-CM

## 2023-09-18 DIAGNOSIS — H16.221: ICD-10-CM

## 2023-09-18 DIAGNOSIS — H18.231: ICD-10-CM

## 2023-09-18 PROCEDURE — 92012 INTRM OPH EXAM EST PATIENT: CPT | Performed by: OPHTHALMOLOGY

## 2023-09-18 ASSESSMENT — TONOMETRY
OD_IOP_MMHG: 13
OD_IOP_MMHG: 10
OS_IOP_MMHG: 12

## 2023-09-18 ASSESSMENT — REFRACTION_CURRENTRX
OD_OVR_VA: 20/
OD_OVR_VA: 20/
OD_CYLINDER: SPH
OD_CYLINDER: -0.25
OS_VPRISM_DIRECTION: PROGS
OD_VPRISM_DIRECTION: PROGS
OS_AXIS: 026
OS_OVR_VA: 20/
OS_ADD: +2.25
OS_AXIS: 34
OD_ADD: +2.25
OS_CYLINDER: +0.75
OD_ADD: +2.25
OS_CYLINDER: -0.75
OD_AXIS: 58
OD_SPHERE: -0.75
OS_SPHERE: -3.25
OS_VPRISM_DIRECTION: PROGS
OD_SPHERE: -0.50
OD_OVR_VA: 20/
OD_VPRISM_DIRECTION: PROGS
OD_CYLINDER: SPH
OS_OVR_VA: 20/
OS_OVR_VA: 20/
OD_SPHERE: -0.75
OS_ADD: +2.50
OS_VPRISM_DIRECTION: PROGS
OS_SPHERE: -4.00
OD_VPRISM_DIRECTION: PROGS
OS_ADD: +2.25
OS_CYLINDER: -0.50
OS_SPHERE: -3.75
OS_AXIS: 114
OD_ADD: +2.50

## 2023-09-18 ASSESSMENT — KERATOMETRY
OD_K1POWER_DIOPTERS: 44.00
OS_K2POWER_DIOPTERS: 45.75
OD_K2POWER_DIOPTERS: 46.00
OS_K1POWER_DIOPTERS: 45.00
METHOD_AUTO_MANUAL: AUTO
OS_AXISANGLE_DEGREES: 117
OD_AXISANGLE_DEGREES: 106

## 2023-09-18 ASSESSMENT — REFRACTION_AUTOREFRACTION
OS_SPHERE: -4.50
OS_CYLINDER: +2.25
OD_CYLINDER: +3.00
OS_AXIS: 132
OD_AXIS: 110
OD_SPHERE: -2.00

## 2023-09-18 ASSESSMENT — REFRACTION_MANIFEST
OD_VA1: 20/40
OS_VA1: 20/20
OD_CYLINDER: SPH
OD_VA1: 20/50-
OD_SPHERE: -1.00
OD_ADD: +2.50
OD_AXIS: 090
OD_CYLINDER: SPH
OD_SPHERE: -2.25
OS_CYLINDER: +1.00
OS_SPHERE: -3.75
OD_SPHERE: -1.00
OS_ADD: +2.50
OD_VA1: 20/30
OD_CYLINDER: +1.75
OS_AXIS: 135

## 2023-09-18 ASSESSMENT — VISUAL ACUITY
OS_BCVA: 20/30-2
OD_BCVA: 20/20

## 2023-09-18 ASSESSMENT — AXIALLENGTH_DERIVED
OD_AL: 23.5767
OS_AL: 24.1839
OS_AL: 24.2352
OD_AL: 23.241

## 2023-09-18 ASSESSMENT — PACHYMETRY
OS_CT_CORRECTION: -3
OS_CT_UM: 582

## 2023-09-18 ASSESSMENT — SPHEQUIV_DERIVED
OD_SPHEQUIV: -1.375
OS_SPHEQUIV: -3.25
OD_SPHEQUIV: -0.5
OS_SPHEQUIV: -3.375

## 2023-09-18 ASSESSMENT — SUPERFICIAL PUNCTATE KERATITIS (SPK): OD_SPK: T

## 2023-10-31 ENCOUNTER — APPOINTMENT (OUTPATIENT)
Dept: ULTRASOUND IMAGING | Facility: IMAGING CENTER | Age: 63
End: 2023-10-31
Payer: MEDICAID

## 2023-10-31 ENCOUNTER — RESULT REVIEW (OUTPATIENT)
Age: 63
End: 2023-10-31

## 2023-10-31 ENCOUNTER — APPOINTMENT (OUTPATIENT)
Dept: MAMMOGRAPHY | Facility: IMAGING CENTER | Age: 63
End: 2023-10-31
Payer: MEDICAID

## 2023-10-31 ENCOUNTER — OUTPATIENT (OUTPATIENT)
Dept: OUTPATIENT SERVICES | Facility: HOSPITAL | Age: 63
LOS: 1 days | End: 2023-10-31
Payer: MEDICAID

## 2023-10-31 ENCOUNTER — APPOINTMENT (OUTPATIENT)
Dept: RADIOLOGY | Facility: IMAGING CENTER | Age: 63
End: 2023-10-31
Payer: MEDICAID

## 2023-10-31 DIAGNOSIS — Z00.8 ENCOUNTER FOR OTHER GENERAL EXAMINATION: ICD-10-CM

## 2023-10-31 PROCEDURE — 77067 SCR MAMMO BI INCL CAD: CPT | Mod: 26

## 2023-10-31 PROCEDURE — 77080 DXA BONE DENSITY AXIAL: CPT | Mod: 26

## 2023-10-31 PROCEDURE — 77063 BREAST TOMOSYNTHESIS BI: CPT | Mod: 26

## 2023-10-31 PROCEDURE — 76641 ULTRASOUND BREAST COMPLETE: CPT | Mod: 26,50

## 2023-10-31 PROCEDURE — 76641 ULTRASOUND BREAST COMPLETE: CPT

## 2023-10-31 PROCEDURE — 77080 DXA BONE DENSITY AXIAL: CPT

## 2023-10-31 PROCEDURE — 77067 SCR MAMMO BI INCL CAD: CPT

## 2023-10-31 PROCEDURE — 77063 BREAST TOMOSYNTHESIS BI: CPT

## 2024-02-07 ENCOUNTER — OFFICE (OUTPATIENT)
Dept: URBAN - METROPOLITAN AREA CLINIC 27 | Facility: CLINIC | Age: 64
Setting detail: OPHTHALMOLOGY
End: 2024-02-07

## 2024-02-07 DIAGNOSIS — H25.13: ICD-10-CM

## 2024-02-07 DIAGNOSIS — H16.221: ICD-10-CM

## 2024-02-07 DIAGNOSIS — H10.45: ICD-10-CM

## 2024-02-07 DIAGNOSIS — H18.231: ICD-10-CM

## 2024-02-07 DIAGNOSIS — B00.52: ICD-10-CM

## 2024-02-07 PROCEDURE — 92012 INTRM OPH EXAM EST PATIENT: CPT | Performed by: OPHTHALMOLOGY

## 2024-02-07 ASSESSMENT — SPHEQUIV_DERIVED
OS_SPHEQUIV: -3.5
OD_SPHEQUIV: -1.375
OD_SPHEQUIV: -0.875
OS_SPHEQUIV: -3.25

## 2024-02-07 ASSESSMENT — REFRACTION_CURRENTRX
OS_AXIS: 34
OD_SPHERE: -0.50
OD_CYLINDER: -0.25
OS_VPRISM_DIRECTION: PROGS
OS_VPRISM_DIRECTION: PROGS
OS_SPHERE: -3.75
OD_SPHERE: -0.75
OD_VPRISM_DIRECTION: PROGS
OS_CYLINDER: -0.75
OD_OVR_VA: 20/
OS_ADD: +2.25
OS_AXIS: 114
OS_VPRISM_DIRECTION: PROGS
OS_OVR_VA: 20/
OS_CYLINDER: +0.75
OS_ADD: +2.50
OD_CYLINDER: SPH
OS_ADD: +2.25
OS_SPHERE: -3.25
OD_VPRISM_DIRECTION: PROGS
OD_ADD: +2.25
OD_ADD: +2.50
OS_OVR_VA: 20/
OD_AXIS: 58
OS_SPHERE: -4.00
OS_AXIS: 026
OS_CYLINDER: -0.50
OD_OVR_VA: 20/
OD_ADD: +2.25
OD_OVR_VA: 20/
OD_CYLINDER: SPH
OS_OVR_VA: 20/
OD_VPRISM_DIRECTION: PROGS
OD_SPHERE: -0.75

## 2024-02-07 ASSESSMENT — REFRACTION_MANIFEST
OD_CYLINDER: SPH
OS_VA1: 20/20
OD_VA1: 20/40
OD_VA1: 20/50-
OD_VA1: 20/30
OS_CYLINDER: +1.00
OD_SPHERE: -1.00
OS_ADD: +2.50
OD_CYLINDER: +1.75
OS_SPHERE: -3.75
OD_SPHERE: -2.25
OD_CYLINDER: SPH
OD_SPHERE: -1.00
OD_AXIS: 090
OS_AXIS: 135
OD_ADD: +2.50

## 2024-02-07 ASSESSMENT — REFRACTION_AUTOREFRACTION
OD_CYLINDER: +2.25
OS_AXIS: 127
OD_AXIS: 111
OD_SPHERE: -2.00
OS_SPHERE: -4.50
OS_CYLINDER: +2.00

## 2024-02-07 ASSESSMENT — SUPERFICIAL PUNCTATE KERATITIS (SPK): OD_SPK: T

## 2024-03-21 DIAGNOSIS — Z00.00 ENCOUNTER FOR GENERAL ADULT MEDICAL EXAMINATION W/OUT ABNORMAL FINDINGS: ICD-10-CM

## 2024-04-19 DIAGNOSIS — R39.9 UNSPECIFIED SYMPTOMS AND SIGNS INVOLVING THE GENITOURINARY SYSTEM: ICD-10-CM

## 2024-04-22 ENCOUNTER — RX RENEWAL (OUTPATIENT)
Age: 64
End: 2024-04-22

## 2024-05-15 ENCOUNTER — APPOINTMENT (OUTPATIENT)
Dept: FAMILY MEDICINE | Facility: CLINIC | Age: 64
End: 2024-05-15
Payer: MEDICAID

## 2024-05-15 ENCOUNTER — NON-APPOINTMENT (OUTPATIENT)
Age: 64
End: 2024-05-15

## 2024-05-15 VITALS
RESPIRATION RATE: 16 BRPM | BODY MASS INDEX: 32.2 KG/M2 | OXYGEN SATURATION: 97 % | HEART RATE: 60 BPM | DIASTOLIC BLOOD PRESSURE: 80 MMHG | WEIGHT: 175 LBS | TEMPERATURE: 95.3 F | SYSTOLIC BLOOD PRESSURE: 122 MMHG | HEIGHT: 62 IN

## 2024-05-15 DIAGNOSIS — R73.03 PREDIABETES.: ICD-10-CM

## 2024-05-15 DIAGNOSIS — E78.5 HYPERLIPIDEMIA, UNSPECIFIED: ICD-10-CM

## 2024-05-15 DIAGNOSIS — R92.30 DENSE BREASTS, UNSPECIFIED: ICD-10-CM

## 2024-05-15 DIAGNOSIS — M85.80 OTHER SPECIFIED DISORDERS OF BONE DENSITY AND STRUCTURE, UNSPECIFIED SITE: ICD-10-CM

## 2024-05-15 DIAGNOSIS — R92.8 OTHER ABNORMAL AND INCONCLUSIVE FINDINGS ON DIAGNOSTIC IMAGING OF BREAST: ICD-10-CM

## 2024-05-15 DIAGNOSIS — Z00.00 ENCOUNTER FOR GENERAL ADULT MEDICAL EXAMINATION W/OUT ABNORMAL FINDINGS: ICD-10-CM

## 2024-05-15 DIAGNOSIS — I10 ESSENTIAL (PRIMARY) HYPERTENSION: ICD-10-CM

## 2024-05-15 DIAGNOSIS — E66.9 OBESITY, UNSPECIFIED: ICD-10-CM

## 2024-05-15 PROCEDURE — 93000 ELECTROCARDIOGRAM COMPLETE: CPT

## 2024-05-15 PROCEDURE — 99396 PREV VISIT EST AGE 40-64: CPT | Mod: 25

## 2024-05-15 RX ORDER — NITROFURANTOIN (MONOHYDRATE/MACROCRYSTALS) 25; 75 MG/1; MG/1
100 CAPSULE ORAL TWICE DAILY
Qty: 14 | Refills: 0 | Status: COMPLETED | COMMUNITY
Start: 2024-05-06 | End: 2024-05-15

## 2024-05-15 NOTE — PHYSICAL EXAM
[Normal Rate] : normal rate  [Regular Rhythm] : with a regular rhythm [Normal S1, S2] : normal S1 and S2 [Declined Breast Exam] : declined breast exam  [Normal Supraclavicular Nodes] : no supraclavicular lymphadenopathy [No Rash] : no rash [Coordination Grossly Intact] : coordination grossly intact [No Focal Deficits] : no focal deficits [Normal Gait] : normal gait [Speech Grossly Normal] : speech grossly normal [Alert and Oriented x3] : oriented to person, place, and time [Normal Mood] : the mood was normal [Normal] : affect was normal and insight and judgment were intact

## 2024-05-15 NOTE — HISTORY OF PRESENT ILLNESS
[FreeTextEntry1] : 62 yo female with PMHx HTN (losartan/hctz) presents to the office for a physical.  [de-identified] : the patient reports recently had UTI symptoms, completed antibiotics, denies any lingering symptoms. reports stress/anxiety over son (30s) who has shizophrenia.

## 2024-05-15 NOTE — HEALTH RISK ASSESSMENT
[0] : 2) Feeling down, depressed, or hopeless: Not at all (0) [PHQ-2 Negative - No further assessment needed] : PHQ-2 Negative - No further assessment needed [Patient reported bone density results were abnormal] : Patient reported bone density results were abnormal [Patient reported colonoscopy was abnormal] : Patient reported colonoscopy was abnormal [Very Good] : ~his/her~  mood as very good [Yes] : Yes [2 - 4 times a month (2 pts)] : 2-4 times a month (2 points) [1 or 2 (0 pts)] : 1 or 2 (0 points) [Never (0 pts)] : Never (0 points) [No] : In the past 12 months have you used drugs other than those required for medical reasons? No [No falls in past year] : Patient reported no falls in the past year [Patient reported mammogram was normal] : Patient reported mammogram was normal [With Family] : lives with family [Employed] : employed [] :  [# Of Children ___] : has [unfilled] children [Sexually Active] : sexually active [Feels Safe at Home] : Feels safe at home [Fully functional (bathing, dressing, toileting, transferring, walking, feeding)] : Fully functional (bathing, dressing, toileting, transferring, walking, feeding) [Fully functional (using the telephone, shopping, preparing meals, housekeeping, doing laundry, using] : Fully functional and needs no help or supervision to perform IADLs (using the telephone, shopping, preparing meals, housekeeping, doing laundry, using transportation, managing medications and managing finances) [Reports normal functional visual acuity (ie: able to read med bottle)] : Reports normal functional visual acuity [Former] : Former [5-9] : 5-9 [> 15 Years] : > 15 Years [Audit-CScore] : 2 [de-identified] : active, walking [de-identified] : tries to be healthy [[id number]] : 0 [High Risk Behavior] : no high risk behavior [Reports changes in hearing] : Reports no changes in hearing [Reports changes in vision] : Reports no changes in vision [MammogramDate] : 2023 [MammogramComments] : referral given [PapSmearComments] : hysterectomy, unsure if she still has cervix, advised to follow up with GYN [BoneDensityDate] : 2023 [BoneDensityComments] : osteopenia [ColonoscopyDate] : 2023 [ColonoscopyComments] : polyps, f/u in five years [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

## 2024-10-23 ENCOUNTER — OFFICE (OUTPATIENT)
Dept: URBAN - METROPOLITAN AREA CLINIC 27 | Facility: CLINIC | Age: 64
Setting detail: OPHTHALMOLOGY
End: 2024-10-23

## 2024-10-23 DIAGNOSIS — H10.45: ICD-10-CM

## 2024-10-23 DIAGNOSIS — B00.52: ICD-10-CM

## 2024-10-23 DIAGNOSIS — H18.231: ICD-10-CM

## 2024-10-23 DIAGNOSIS — H16.221: ICD-10-CM

## 2024-10-23 DIAGNOSIS — H25.13: ICD-10-CM

## 2024-10-23 PROCEDURE — 92014 COMPRE OPH EXAM EST PT 1/>: CPT | Performed by: OPHTHALMOLOGY

## 2024-10-23 ASSESSMENT — REFRACTION_CURRENTRX
OD_CYLINDER: SPH
OS_SPHERE: -4.00
OD_SPHERE: -0.50
OD_AXIS: 58
OS_CYLINDER: -0.75
OS_ADD: +2.25
OS_AXIS: 34
OS_OVR_VA: 20/
OS_VPRISM_DIRECTION: PROGS
OS_OVR_VA: 20/
OS_CYLINDER: -0.50
OS_CYLINDER: +0.75
OD_SPHERE: -0.75
OS_SPHERE: -3.25
OS_AXIS: 114
OD_ADD: +2.25
OD_VPRISM_DIRECTION: PROGS
OD_VPRISM_DIRECTION: PROGS
OS_VPRISM_DIRECTION: PROGS
OD_OVR_VA: 20/
OD_ADD: +2.25
OS_OVR_VA: 20/
OD_OVR_VA: 20/
OD_ADD: +2.50
OD_CYLINDER: -0.25
OD_CYLINDER: SPH
OS_AXIS: 026
OS_VPRISM_DIRECTION: PROGS
OD_SPHERE: -0.75
OD_OVR_VA: 20/
OS_SPHERE: -3.75
OS_ADD: +2.25
OD_VPRISM_DIRECTION: PROGS
OS_ADD: +2.50

## 2024-10-23 ASSESSMENT — REFRACTION_AUTOREFRACTION
OS_AXIS: 130
OD_CYLINDER: +2.00
OD_SPHERE: -1.50
OD_AXIS: 119
OS_CYLINDER: +2.00
OS_SPHERE: -4.50

## 2024-10-23 ASSESSMENT — TONOMETRY
OD_IOP_MMHG: 17
OS_IOP_MMHG: 15
OD_IOP_MMHG: 11
OS_IOP_MMHG: 12

## 2024-10-23 ASSESSMENT — CONFRONTATIONAL VISUAL FIELD TEST (CVF)
OS_FINDINGS: FULL
OD_FINDINGS: FULL

## 2024-10-23 ASSESSMENT — KERATOMETRY
OD_AXISANGLE_DEGREES: 104
OD_K1POWER_DIOPTERS: 44.00
OS_K2POWER_DIOPTERS: 45.75
OD_K2POWER_DIOPTERS: 45.25
OS_K1POWER_DIOPTERS: 44.75
METHOD_AUTO_MANUAL: AUTO
OS_AXISANGLE_DEGREES: 110

## 2024-10-23 ASSESSMENT — REFRACTION_MANIFEST
OD_CYLINDER: +1.75
OD_CYLINDER: SPH
OD_AXIS: 090
OS_VA1: 20/20
OS_ADD: +2.50
OD_VA1: 20/30
OD_SPHERE: -1.00
OD_SPHERE: -1.00
OD_CYLINDER: SPH
OS_CYLINDER: +1.00
OD_VA1: 20/50-
OD_VA1: 20/40
OD_SPHERE: -2.25
OS_SPHERE: -3.75
OS_AXIS: 135
OD_ADD: +2.50

## 2024-10-23 ASSESSMENT — PACHYMETRY
OS_CT_UM: 582
OS_CT_CORRECTION: -3

## 2024-10-23 ASSESSMENT — SUPERFICIAL PUNCTATE KERATITIS (SPK): OD_SPK: T

## 2024-10-23 ASSESSMENT — VISUAL ACUITY
OD_BCVA: 20/25-1
OS_BCVA: 20/50-2

## 2024-11-06 ENCOUNTER — APPOINTMENT (OUTPATIENT)
Dept: ULTRASOUND IMAGING | Facility: CLINIC | Age: 64
End: 2024-11-06

## 2024-11-06 ENCOUNTER — RESULT REVIEW (OUTPATIENT)
Age: 64
End: 2024-11-06

## 2024-11-06 ENCOUNTER — APPOINTMENT (OUTPATIENT)
Dept: MAMMOGRAPHY | Facility: CLINIC | Age: 64
End: 2024-11-06

## 2024-11-06 PROCEDURE — 76641 ULTRASOUND BREAST COMPLETE: CPT | Mod: 50

## 2024-11-06 PROCEDURE — 77063 BREAST TOMOSYNTHESIS BI: CPT

## 2024-11-06 PROCEDURE — 77067 SCR MAMMO BI INCL CAD: CPT

## 2025-03-13 DIAGNOSIS — Z00.00 ENCOUNTER FOR GENERAL ADULT MEDICAL EXAMINATION W/OUT ABNORMAL FINDINGS: ICD-10-CM

## 2025-05-21 ENCOUNTER — NON-APPOINTMENT (OUTPATIENT)
Age: 65
End: 2025-05-21

## 2025-05-21 ENCOUNTER — APPOINTMENT (OUTPATIENT)
Dept: FAMILY MEDICINE | Facility: CLINIC | Age: 65
End: 2025-05-21
Payer: MEDICARE

## 2025-05-21 VITALS
SYSTOLIC BLOOD PRESSURE: 124 MMHG | TEMPERATURE: 97.3 F | OXYGEN SATURATION: 95 % | BODY MASS INDEX: 32.76 KG/M2 | RESPIRATION RATE: 16 BRPM | HEIGHT: 62 IN | WEIGHT: 178 LBS | DIASTOLIC BLOOD PRESSURE: 80 MMHG | HEART RATE: 62 BPM

## 2025-05-21 DIAGNOSIS — R73.03 PREDIABETES.: ICD-10-CM

## 2025-05-21 DIAGNOSIS — E66.811 OBESITY, CLASS 1: ICD-10-CM

## 2025-05-21 DIAGNOSIS — M54.32 SCIATICA, LEFT SIDE: ICD-10-CM

## 2025-05-21 DIAGNOSIS — E78.5 HYPERLIPIDEMIA, UNSPECIFIED: ICD-10-CM

## 2025-05-21 DIAGNOSIS — Z13.6 ENCOUNTER FOR SCREENING FOR CARDIOVASCULAR DISORDERS: ICD-10-CM

## 2025-05-21 DIAGNOSIS — I10 ESSENTIAL (PRIMARY) HYPERTENSION: ICD-10-CM

## 2025-05-21 DIAGNOSIS — Z00.00 ENCOUNTER FOR GENERAL ADULT MEDICAL EXAMINATION W/OUT ABNORMAL FINDINGS: ICD-10-CM

## 2025-05-21 DIAGNOSIS — M85.80 OTHER SPECIFIED DISORDERS OF BONE DENSITY AND STRUCTURE, UNSPECIFIED SITE: ICD-10-CM

## 2025-05-21 PROCEDURE — G0438: CPT

## 2025-05-21 PROCEDURE — 93000 ELECTROCARDIOGRAM COMPLETE: CPT

## 2025-05-22 PROBLEM — Z13.6 SCREENING FOR CARDIOVASCULAR CONDITION: Status: ACTIVE | Noted: 2025-05-22

## 2025-05-22 PROBLEM — E66.811 OBESITY (BMI 30.0-34.9): Status: ACTIVE | Noted: 2021-01-15

## 2025-05-29 ENCOUNTER — RX RENEWAL (OUTPATIENT)
Age: 65
End: 2025-05-29

## 2025-08-19 ENCOUNTER — APPOINTMENT (OUTPATIENT)
Dept: RADIOLOGY | Facility: CLINIC | Age: 65
End: 2025-08-19
Payer: MEDICARE

## 2025-08-19 PROCEDURE — 77080 DXA BONE DENSITY AXIAL: CPT

## 2025-08-27 ENCOUNTER — OFFICE (OUTPATIENT)
Dept: URBAN - METROPOLITAN AREA CLINIC 27 | Facility: CLINIC | Age: 65
Setting detail: OPHTHALMOLOGY
End: 2025-08-27
Payer: COMMERCIAL

## 2025-08-27 DIAGNOSIS — H18.231: ICD-10-CM

## 2025-08-27 DIAGNOSIS — B00.52: ICD-10-CM

## 2025-08-27 DIAGNOSIS — H25.13: ICD-10-CM

## 2025-08-27 PROCEDURE — 92285 EXTERNAL OCULAR PHOTOGRAPHY: CPT | Performed by: OPHTHALMOLOGY

## 2025-08-27 PROCEDURE — 99213 OFFICE O/P EST LOW 20 MIN: CPT | Performed by: OPHTHALMOLOGY

## 2025-08-27 ASSESSMENT — REFRACTION_AUTOREFRACTION
OS_CYLINDER: +2.50
OS_SPHERE: -4.75
OD_SPHERE: -1.50
OS_AXIS: 133
OD_CYLINDER: +2.00
OD_AXIS: 119

## 2025-08-27 ASSESSMENT — REFRACTION_MANIFEST
OD_ADD: +2.50
OS_CYLINDER: +1.00
OD_VA1: 20/50-
OS_SPHERE: -3.75
OD_AXIS: 090
OD_SPHERE: -1.00
OS_ADD: +2.50
OD_CYLINDER: SPH
OD_VA1: 20/40
OS_VA1: 20/20
OD_SPHERE: -1.00
OD_CYLINDER: SPH
OD_CYLINDER: +1.75
OS_AXIS: 135
OD_SPHERE: -2.25
OD_VA1: 20/30

## 2025-08-27 ASSESSMENT — REFRACTION_CURRENTRX
OD_OVR_VA: 20/
OS_AXIS: 114
OS_VPRISM_DIRECTION: PROGS
OD_VPRISM_DIRECTION: PROGS
OD_AXIS: 58
OS_SPHERE: -4.00
OS_SPHERE: -3.75
OD_CYLINDER: -0.25
OS_SPHERE: -3.25
OS_AXIS: 34
OS_ADD: +2.25
OD_CYLINDER: SPH
OD_CYLINDER: SPH
OS_CYLINDER: -0.50
OD_SPHERE: -0.75
OS_OVR_VA: 20/
OS_AXIS: 026
OS_VPRISM_DIRECTION: PROGS
OS_CYLINDER: -0.75
OD_OVR_VA: 20/
OS_VPRISM_DIRECTION: PROGS
OS_ADD: +2.25
OD_ADD: +2.25
OD_OVR_VA: 20/
OD_VPRISM_DIRECTION: PROGS
OD_SPHERE: -0.75
OD_ADD: +2.50
OD_VPRISM_DIRECTION: PROGS
OD_ADD: +2.25
OS_OVR_VA: 20/
OS_OVR_VA: 20/
OS_ADD: +2.50
OS_CYLINDER: +0.75
OD_SPHERE: -0.50

## 2025-08-27 ASSESSMENT — TONOMETRY
OD_IOP_MMHG: 11
OS_IOP_MMHG: 13
OD_IOP_MMHG: 10

## 2025-08-27 ASSESSMENT — PACHYMETRY
OS_CT_UM: 582
OS_CT_CORRECTION: -3

## 2025-08-27 ASSESSMENT — KERATOMETRY
METHOD_AUTO_MANUAL: AUTO
OD_K2POWER_DIOPTERS: 45.75
OS_K1POWER_DIOPTERS: 44.75
OD_K1POWER_DIOPTERS: 44.50
OS_AXISANGLE_DEGREES: 120
OS_K2POWER_DIOPTERS: 45.75
OD_AXISANGLE_DEGREES: 83

## 2025-08-27 ASSESSMENT — VISUAL ACUITY
OD_BCVA: 20/20-2
OS_BCVA: 20/50-1